# Patient Record
Sex: FEMALE | Race: WHITE | Employment: FULL TIME | URBAN - METROPOLITAN AREA
[De-identification: names, ages, dates, MRNs, and addresses within clinical notes are randomized per-mention and may not be internally consistent; named-entity substitution may affect disease eponyms.]

---

## 2019-03-14 ENCOUNTER — HOSPITAL ENCOUNTER (OUTPATIENT)
Dept: CT IMAGING | Age: 64
Discharge: HOME | End: 2019-03-14
Payer: SELF-PAY

## 2019-03-14 DIAGNOSIS — Z00.00 PREVENTATIVE HEALTH CARE: ICD-10-CM

## 2019-03-14 PROCEDURE — 75571 CT HRT W/O DYE W/CA TEST: CPT

## 2019-03-15 NOTE — CARDIO/PULMONARY
Cardiopulmonary Rehab: I reached this patient by phone and shared her coronary calcium CT score of \"97 \" with her. Education given regarding coronary artery disease and its effects on the cardiovascular system were reviewed. Patient states that she does have a family history of coronary artery disease, that she does have diabetes, and  states she is not a nicotine user. Patient reports she is currently requiring cholesterol medication and blood pressure medication. Patient reports being obese (calculated BMI 33.6), reports not regularly making heart healthy diet choices and is regularly physically active. We discussed the recommendations for and potential benefits of weight loss, increasing her physical activity, and a heart healthier diet. Patient does feel that stress is a risk factor for her. We discussed the potential daily stress management benefits of regular physical activity, natalie chi, yoga and deep breathing throughout the day. We also discussed the lifestyle management information offered in books by Dr. Gwen Randle. Patient to follow up with primary care physician, Dr. Garrick Woodard, and endocrinologist, Dr. Johana Mccallum, and asks that we fax a copy of this report to them - which we will do. Understanding verbalized and no further questions at this time.

## 2019-03-21 ENCOUNTER — TELEPHONE (OUTPATIENT)
Dept: CARDIAC REHAB | Age: 64
End: 2019-03-21

## 2019-03-21 NOTE — TELEPHONE ENCOUNTER
3/21/2019 [facility]: Faxed Ms. Ezekiel Moctezuma heart ct scan results to physician, Dr. Freedom Begum, per [facility] request. [facility]    3/21/2019 [facility]: Faxed Ms. Ezekiel Moctezuma heart ct scan results to physician, Dr. Sebastien Redmond, per [facility] request. [facility]

## 2021-01-20 ENCOUNTER — OFFICE VISIT (OUTPATIENT)
Dept: SURGERY | Age: 66
End: 2021-01-20
Payer: COMMERCIAL

## 2021-01-20 VITALS
TEMPERATURE: 96.1 F | SYSTOLIC BLOOD PRESSURE: 132 MMHG | HEART RATE: 102 BPM | DIASTOLIC BLOOD PRESSURE: 72 MMHG | HEIGHT: 60 IN | BODY MASS INDEX: 33.96 KG/M2 | WEIGHT: 173 LBS

## 2021-01-20 DIAGNOSIS — R92.8 ABNORMALITY OF RIGHT BREAST ON SCREENING MAMMOGRAM: Primary | ICD-10-CM

## 2021-01-20 PROCEDURE — 99243 OFF/OP CNSLTJ NEW/EST LOW 30: CPT | Performed by: SURGERY

## 2021-01-20 PROCEDURE — 76642 ULTRASOUND BREAST LIMITED: CPT | Performed by: SURGERY

## 2021-01-20 NOTE — LETTER
1/25/2021 1:21 PM 
 
Patient:  Greg Cardona YOB: 1955 Date of Visit: 1/20/2021 Dear Dr. Marge Kaye: 
 
 
Thank you for referring Ms. David Clemons to me for evaluation/treatment. Below are the relevant portions of my assessment and plan of care. If you have questions, please do not hesitate to call me. I look forward to following Ms. Medeiros along with you.  
 
 
 
Sincerely, 
 
 
Wendy Brantley MD

## 2021-01-20 NOTE — PATIENT INSTRUCTIONS

## 2021-01-20 NOTE — PROGRESS NOTES
HISTORY OF PRESENT ILLNESS Breezy Madsen is a 60 y.o. female. HPI  NEW patient consult referred by  Dr. Kathryn Tran for abnormal RIGHT breast mammogram. Denies palpable lump or skin changes. No pain. Family History:  
Denies FH of breast or ovarian cancer. Mammogram, [facility], 12/20/20, BIRADS 3 Review of Systems All other systems reviewed and are negative. Physical Exam 
 
ASSESSMENT and PLAN 
{ASSESSMENT/PLAN:76542}

## 2021-01-20 NOTE — PROGRESS NOTES
HISTORY OF PRESENT ILLNESS  Randall Rashid is a 60 y.o. female. HPI  NEW patient consult referred by  Dr. Lucio Malave for abnormal RIGHT breast mammogram. Denies palpable lump or skin changes. No pain.  Pt notes hx of lipoma excision from the RIGHT breast.      Family History:   Denies FH of breast or ovarian cancer.     Mammogram, [facility], 12/20/20, BIRADS 3      Past Medical History:   Diagnosis Date    DM (diabetes mellitus) ([address]     High cholesterol     HTN (hypertension)     Hypothyroidism        Past Surgical History:   Procedure Laterality Date    HX BARTOLOME AND BSO  2/06       Social History     Socioeconomic History    Marital status:      Spouse name: Not on file    Number of children: Not on file    Years of education: Not on file    Highest education level: Not on file   Occupational History    Not on file   Social Needs    Financial resource strain: Not on file    Food insecurity     Worry: Not on file     Inability: Not on file    Transportation needs     Medical: Not on file     Non-medical: Not on file   Tobacco Use    Smoking status: Never Smoker   Substance and Sexual Activity    Alcohol use: No    Drug use: Not on file    Sexual activity: Not on file   Lifestyle    Physical activity     Days per week: Not on file     Minutes per session: Not on file    Stress: Not on file   Relationships    Social connections     Talks on phone: Not on file     Gets together: Not on file     Attends [employer] service: Not on file     Active member of club or organization: Not on file     Attends meetings of clubs or organizations: Not on file     Relationship status: Not on file    Intimate partner violence     Fear of current or ex partner: Not on file     Emotionally abused: Not on file     Physically abused: Not on file     Forced sexual activity: Not on file   Other Topics Concern    Not on file   Social History Narrative    Not on file       Current Outpatient Medications on File Prior to Visit   Medication Sig Dispense Refill    glipiZIDE SR (GLUCOTROL XL) 5 mg CR tablet TAKE 1 TABLET TWICE A DAY (DUE TO BE SEEN) 180 Tab 0    empagliflozin (JARDIANCE) 25 mg tablet       atorvastatin (LIPITOR) 40 mg tablet TAKE 1 TABLET BY MOUTH EVERY DAY  1    levothyroxine (SYNTHROID) 137 mcg tablet Take  by mouth Daily (before breakfast).  losartan (COZAAR) 50 mg tablet Take  by mouth daily.  dulaglutide (TRULICITY SC) by SubCUTAneous route.  metFORMIN (GLUCOPHAGE) 500 mg tablet TAKE 2 TABLETS TWICE A DAY (DUE TO BE SEEN) 120 Tab 0    levothyroxine (SYNTHROID) 150 mcg tablet TAKE 1 TABLET DAILY 90 Tab 1    EPINEPHrine (EpiPen 2-Curtis) 0.3 mg/0.3 mL injection USE AS DIRECTED FOR ANAPHYLAXIS 2 Syringe 0    [DISCONTINUED] amoxicillin-clavulanate (AUGMENTIN) 875-125 mg per tablet Take 1 Tab by mouth every twelve (12) hours. 20 Tab 0    [DISCONTINUED] predniSONE (DELTASONE) 10 mg tablet Take 10 mg by mouth daily (with breakfast). 5 Tab 0    [DISCONTINUED] azithromycin (ZITHROMAX) 250 mg tablet Take 2 pills today, then 1 pill daily until done 6 Tab 0    ACCU-CHEK COMPACT PLUS TEST strp USE AS DIRECTED 100 Strip prn     No current facility-administered medications on file prior to visit. Allergies   Allergen Reactions    Hydrochlorothiazide Other (comments)     Throat tightness    Prinivil [Lisinopril] Other (comments)     Vision probs         OB History        3    Para        Term                AB        Living           SAB        TAB        Ectopic        Molar        Multiple        Live Births              Obstetric Comments   Menarche 16, LMP , # of children 1, age of 4st delivery 35, Hysterectomy/oophorectomy yes/yes, Breast bx no, history of breast feeding yes, BCP no, Hormone therapy no             Review of Systems   Constitutional: Negative. HENT: Negative. Eyes: Negative. Respiratory: Negative. Cardiovascular: Negative. Gastrointestinal: Negative. Genitourinary: Negative. Musculoskeletal: Negative. Skin: Negative. Neurological: Negative. Endo/Heme/Allergies: Negative. Psychiatric/Behavioral: Negative. Physical Exam  Exam conducted with a chaperone present. Cardiovascular:      Rate and Rhythm: Normal rate and regular rhythm. Heart sounds: Normal heart sounds. Pulmonary:      Breath sounds: Normal breath sounds. Chest:      Breasts: Breasts are symmetrical.         Right: Normal. No swelling, bleeding, inverted nipple, mass, nipple discharge, skin change or tenderness. Left: Normal. No swelling, bleeding, inverted nipple, mass, nipple discharge, skin change or tenderness. Lymphadenopathy:      Cervical:      Right cervical: No superficial, deep or posterior cervical adenopathy. Left cervical: No superficial, deep or posterior cervical adenopathy. Upper Body:      Right upper body: No supraclavicular or axillary adenopathy. Left upper body: No supraclavicular or axillary adenopathy. BREAST ULTRASOUND  Indication: RIGHT breast abnormal mammogram  Technique: The area was scanned using a high-frequency linear-array near-field transducer  Findings: No abnormal mass, lesion, or shadowing noted; no cysts; no axillary lymphadenopathy  Impression: Normal breast tissue  Disposition: No worrisome finding on ultrasound      ASSESSMENT and PLAN    ICD-10-CM ICD-9-CM    1. Abnormality of right breast on screening mammogram  R92.8 793.80       New patient presents for abnormal RIGHT breast mammogram, and is doing well overall. Physical exam today normal, with normal RIGHT breast US. Reviewed imaging reports and films. Mammogram looks stable, but per recommendation pt will get diagnostic mammogram and US in 6 months. Will load films per pt preference to have imaging here. F/U PRN. This plan was reviewed with the patient and patient agrees. All questions were answered.     Total time spent was 40 minutes.     Written by Roberto Salamanca, as dictated by Dr. Yamil Muniz MD.

## 2021-01-25 ENCOUNTER — DOCUMENTATION ONLY (OUTPATIENT)
Dept: SURGERY | Age: 66
End: 2021-01-25

## 2021-01-25 NOTE — PROGRESS NOTES
Type of Film: [x] CD [] FILMS  Type of Test: [] MRI [x] MAMMO  From: [facility]  Given to: [facility]  LOCATION  To be Downloaded into PACS:  YES    Patient will follow-up here in 7/2021.

## 2021-03-01 ENCOUNTER — IMMUNIZATION (OUTPATIENT)
Dept: FAMILY MEDICINE CLINIC | Age: 66
End: 2021-03-01

## 2021-03-01 DIAGNOSIS — Z23 ENCOUNTER FOR IMMUNIZATION: Primary | ICD-10-CM

## 2021-03-01 PROCEDURE — 91300 COVID-19, MRNA, LNP-S, PF, 30MCG/0.3ML DOSE(PFIZER): CPT

## 2021-03-01 PROCEDURE — 0001A COVID-19, MRNA, LNP-S, PF, 30MCG/0.3ML DOSE(PFIZER): CPT

## 2021-03-22 ENCOUNTER — IMMUNIZATION (OUTPATIENT)
Dept: FAMILY MEDICINE CLINIC | Age: 66
End: 2021-03-22

## 2021-03-22 DIAGNOSIS — Z23 ENCOUNTER FOR IMMUNIZATION: Primary | ICD-10-CM

## 2021-03-22 PROCEDURE — 0002A COVID-19, MRNA, LNP-S, PF, 30MCG/0.3ML DOSE(PFIZER): CPT

## 2021-03-22 PROCEDURE — 91300 COVID-19, MRNA, LNP-S, PF, 30MCG/0.3ML DOSE(PFIZER): CPT

## 2021-07-13 ENCOUNTER — HOSPITAL ENCOUNTER (OUTPATIENT)
Dept: MAMMOGRAPHY | Age: 66
Discharge: HOME | End: 2021-07-13
Attending: SURGERY
Payer: COMMERCIAL

## 2021-07-13 DIAGNOSIS — R92.8 ABNORMALITY OF RIGHT BREAST ON SCREENING MAMMOGRAM: ICD-10-CM

## 2021-07-13 PROCEDURE — 77061 BREAST TOMOSYNTHESIS UNI: CPT

## 2022-01-05 ENCOUNTER — DOCUMENTATION ONLY (OUTPATIENT)
Dept: SURGERY | Age: 67
End: 2022-01-05

## 2022-01-05 NOTE — PROGRESS NOTES
Patient called requesting mammogram report to be sent to [email or web address]. [email or web address].  Faxed report to [phone]

## 2023-03-03 ENCOUNTER — HOSPITAL ENCOUNTER (OUTPATIENT)
Dept: CT IMAGING | Age: 68
Discharge: HOME | End: 2023-03-03
Attending: INTERNAL MEDICINE
Payer: COMMERCIAL

## 2023-03-03 DIAGNOSIS — H93.13 TINNITUS OF BOTH EARS: ICD-10-CM

## 2023-03-03 LAB — CREAT BLD-MCNC: 0.6 MG/DL (ref 0.6–1.3)

## 2023-03-03 PROCEDURE — 70496 CT ANGIOGRAPHY HEAD: CPT

## 2023-03-03 PROCEDURE — 74011000636 HC RX REV CODE- 636: Performed by: INTERNAL MEDICINE

## 2023-03-03 PROCEDURE — 82565 ASSAY OF CREATININE: CPT

## 2023-03-03 RX ADMIN — IOPAMIDOL 100 ML: 755 INJECTION, SOLUTION INTRAVENOUS at 14:59

## 2023-03-06 ENCOUNTER — TRANSCRIBE ORDER (OUTPATIENT)
Dept: SCHEDULING | Age: 68
End: 2023-03-06

## 2023-03-06 DIAGNOSIS — H93.13 TINNITUS OF BOTH EARS: Primary | ICD-10-CM

## 2024-07-25 ENCOUNTER — HOSPITAL ENCOUNTER (OUTPATIENT)
Facility: HOSPITAL | Age: 69
Discharge: HOME | End: 2024-07-27
Attending: INTERNAL MEDICINE
Payer: COMMERCIAL

## 2024-07-25 VITALS — BODY MASS INDEX: 30.04 KG/M2 | WEIGHT: 153 LBS | HEIGHT: 60 IN

## 2024-07-25 DIAGNOSIS — H35.82 OCULAR ISCHEMIC SYNDROME: ICD-10-CM

## 2024-07-25 LAB
ECHO AO ROOT DIAM: 2.7 CM
ECHO AO ROOT INDEX: 1.62 CM/M2
ECHO AV AREA PEAK VELOCITY: 4.3 CM2
ECHO AV AREA/BSA PEAK VELOCITY: 2.6 CM2/M2
ECHO AV PEAK GRADIENT: 8 MMHG
ECHO AV PEAK VELOCITY: 1.4 M/S
ECHO AV VELOCITY RATIO: 0.71
ECHO BSA: 1.71 M2
ECHO EST RA PRESSURE: 3 MMHG
ECHO LA DIAMETER INDEX: 1.62 CM/M2
ECHO LA DIAMETER: 2.7 CM
ECHO LA TO AORTIC ROOT RATIO: 1
ECHO LA VOL A-L A2C: 39 ML (ref 22–52)
ECHO LA VOL A-L A4C: 54 ML (ref 22–52)
ECHO LA VOL MOD A2C: 37 ML (ref 22–52)
ECHO LA VOL MOD A4C: 53 ML (ref 22–52)
ECHO LA VOLUME AREA LENGTH: 49 ML
ECHO LA VOLUME INDEX A-L A2C: 23 ML/M2 (ref 16–34)
ECHO LA VOLUME INDEX A-L A4C: 32 ML/M2 (ref 16–34)
ECHO LA VOLUME INDEX AREA LENGTH: 29 ML/M2 (ref 16–34)
ECHO LA VOLUME INDEX MOD A2C: 22 ML/M2 (ref 16–34)
ECHO LA VOLUME INDEX MOD A4C: 32 ML/M2 (ref 16–34)
ECHO LV E' LATERAL VELOCITY: 7 CM/S
ECHO LV E' SEPTAL VELOCITY: 7 CM/S
ECHO LV FRACTIONAL SHORTENING: 39 % (ref 28–44)
ECHO LV INTERNAL DIMENSION DIASTOLE INDEX: 2.63 CM/M2
ECHO LV INTERNAL DIMENSION DIASTOLIC: 4.4 CM (ref 3.9–5.3)
ECHO LV INTERNAL DIMENSION SYSTOLIC INDEX: 1.62 CM/M2
ECHO LV INTERNAL DIMENSION SYSTOLIC: 2.7 CM
ECHO LV IVSD: 0.7 CM (ref 0.6–0.9)
ECHO LV MASS 2D: 92.1 G (ref 67–162)
ECHO LV MASS INDEX 2D: 55.1 G/M2 (ref 43–95)
ECHO LV POSTERIOR WALL DIASTOLIC: 0.7 CM (ref 0.6–0.9)
ECHO LV RELATIVE WALL THICKNESS RATIO: 0.32
ECHO LVOT AREA: 6.2 CM2
ECHO LVOT DIAM: 2.8 CM
ECHO LVOT PEAK GRADIENT: 4 MMHG
ECHO LVOT PEAK VELOCITY: 1 M/S
ECHO MV A VELOCITY: 0.96 M/S
ECHO MV E VELOCITY: 0.7 M/S
ECHO MV E/A RATIO: 0.73
ECHO MV E/E' LATERAL: 10
ECHO MV E/E' RATIO (AVERAGED): 10
ECHO MV E/E' SEPTAL: 10
ECHO MV REGURGITANT PEAK GRADIENT: 5 MMHG
ECHO MV REGURGITANT PEAK VELOCITY: 1.1 M/S
ECHO PULMONARY ARTERY END DIASTOLIC PRESSURE: 11 MMHG
ECHO PV MAX VELOCITY: 0.8 M/S
ECHO PV PEAK GRADIENT: 2 MMHG
ECHO RIGHT VENTRICULAR SYSTOLIC PRESSURE (RVSP): 8 MMHG
ECHO RV FREE WALL PEAK S': 12 CM/S
ECHO RV TAPSE: 1.6 CM (ref 1.7–?)
ECHO TV REGURGITANT MAX VELOCITY: 1.07 M/S
ECHO TV REGURGITANT PEAK GRADIENT: 5 MMHG

## 2024-07-25 PROCEDURE — 93306 TTE W/DOPPLER COMPLETE: CPT
